# Patient Record
Sex: MALE | Race: WHITE | Employment: FULL TIME | URBAN - METROPOLITAN AREA
[De-identification: names, ages, dates, MRNs, and addresses within clinical notes are randomized per-mention and may not be internally consistent; named-entity substitution may affect disease eponyms.]

---

## 2019-12-06 ENCOUNTER — HOSPITAL ENCOUNTER (EMERGENCY)
Facility: CLINIC | Age: 24
Discharge: HOME | End: 2019-12-06
Attending: EMERGENCY MEDICINE | Admitting: EMERGENCY MEDICINE

## 2019-12-06 VITALS
DIASTOLIC BLOOD PRESSURE: 87 MMHG | RESPIRATION RATE: 18 BRPM | WEIGHT: 160 LBS | OXYGEN SATURATION: 99 % | HEIGHT: 72 IN | TEMPERATURE: 98.3 F | HEART RATE: 104 BPM | BODY MASS INDEX: 21.67 KG/M2 | SYSTOLIC BLOOD PRESSURE: 126 MMHG

## 2019-12-06 DIAGNOSIS — S01.81XA CHIN LACERATION, INITIAL ENCOUNTER: ICD-10-CM

## 2019-12-06 DIAGNOSIS — F10.220 ACUTE ALCOHOLIC INTOXICATION IN ALCOHOLISM WITHOUT COMPLICATION (H): ICD-10-CM

## 2019-12-06 PROCEDURE — 99283 EMERGENCY DEPT VISIT LOW MDM: CPT | Mod: 25 | Performed by: EMERGENCY MEDICINE

## 2019-12-06 PROCEDURE — 12011 RPR F/E/E/N/L/M 2.5 CM/<: CPT | Performed by: EMERGENCY MEDICINE

## 2019-12-06 PROCEDURE — 90715 TDAP VACCINE 7 YRS/> IM: CPT | Performed by: EMERGENCY MEDICINE

## 2019-12-06 PROCEDURE — 12011 RPR F/E/E/N/L/M 2.5 CM/<: CPT | Mod: Z6 | Performed by: EMERGENCY MEDICINE

## 2019-12-06 PROCEDURE — 99284 EMERGENCY DEPT VISIT MOD MDM: CPT | Mod: 25 | Performed by: EMERGENCY MEDICINE

## 2019-12-06 PROCEDURE — 90471 IMMUNIZATION ADMIN: CPT | Performed by: EMERGENCY MEDICINE

## 2019-12-06 PROCEDURE — 25000128 H RX IP 250 OP 636: Performed by: EMERGENCY MEDICINE

## 2019-12-06 RX ADMIN — CLOSTRIDIUM TETANI TOXOID ANTIGEN (FORMALDEHYDE INACTIVATED), CORYNEBACTERIUM DIPHTHERIAE TOXOID ANTIGEN (FORMALDEHYDE INACTIVATED), BORDETELLA PERTUSSIS TOXOID ANTIGEN (GLUTARALDEHYDE INACTIVATED), BORDETELLA PERTUSSIS FILAMENTOUS HEMAGGLUTININ ANTIGEN (FORMALDEHYDE INACTIVATED), BORDETELLA PERTUSSIS PERTACTIN ANTIGEN, AND BORDETELLA PERTUSSIS FIMBRIAE 2/3 ANTIGEN 0.5 ML: 5; 2; 2.5; 5; 3; 5 INJECTION, SUSPENSION INTRAMUSCULAR at 07:18

## 2019-12-06 ASSESSMENT — MIFFLIN-ST. JEOR: SCORE: 1758.76

## 2019-12-06 NOTE — ED TRIAGE NOTES
Pt fell tonight at hotel while drinking. Pt with slurred speech, red eyes. Pt hit chin. Laceration present, bleeding controlled.

## 2019-12-06 NOTE — ED AVS SNAPSHOT
[facility] Emergency Department  [address] [address]  Phone:  [phone]  Fax:  [phone]                                    José Manuel Rand   MRN: [id number]    Department:  [facility] Emergency Department   Date of Visit:  12/6/2019           After Visit Summary Signature Page    I have received my discharge instructions, and my questions have been answered. I have discussed any challenges I see with this plan with the nurse or doctor.    ..........................................................................................................................................  Patient/Patient Representative Signature      ..........................................................................................................................................  Patient Representative Print Name and Relationship to Patient    ..................................................               ................................................  Date                                   Time    ..........................................................................................................................................  Reviewed by Signature/Title    ...................................................              ..............................................  Date                                               Time          22EPIC Rev 08/18

## 2019-12-06 NOTE — ED NOTES
Pt presents with EMS for evaluation of chin lac. Bleeding controlled to area with light pressure. Per EMS, patient staying at hotel overnight--due to check into [place] for alcohol treatment this morning. Pt presents with slurred speech, red eyes, some agitation--does not want to be in ED, wants to go back to hotel for remainder of night. Pt reports he is staying at the hotel by himself--[place], [place] town yesterday.

## 2019-12-06 NOTE — DISCHARGE INSTRUCTIONS
Discharge Information: Emergency Department    José Manuel saw Dr. Araya for a cut on his chin.     Home care  Keep the wound clean and dry for 24 hours. After that, you can wash it gently with soap and water. Don t soak the wound and be gentle when drying it.  Do not put any cream or ointment on the wound. It was treated with Dermabond tissue glue. Using cream or ointment will make the glue fall off too soon. The glue should peel off in 5 to 10 days.  When the wound has healed, use sunscreen on it every time he will be in the sun for the next year or so. This will help the scar fade.     When to get help  Please return to the ED or contact his primary doctor if he   feels much worse.  has a fever over 102.  has pus or blood leaking from the wound, or the wound becomes very red or painful.  Call if you have any other concerns.

## 2019-12-06 NOTE — ED PROVIDER NOTES
History     Chief Complaint   Patient presents with     Laceration     pt fell at hotel, no LOC-per patient.       Alcohol Problem     pt intoxicated upon arrival to ED with EMS due to go to [place] this AM for alcohol abuse     HPI  José Manuel Rand is a 24 year old male  brought in by prehospital providers with altered mental status. He was reportedly found unable to care for self at a hotel after he fell and hit his chin. No reported LOC. No vomiting. There is a history of alcohol and or drug use as a suspicious etiology for altered mental status. The patient is unable to provide a coherent, reliable history. There was no reported history of seizure activity.      Allergies:  No Known Allergies    Problem List:    There are no active problems to display for this patient.       Past Medical History:    No past medical history on file.    Past Surgical History:    No past surgical history on file.    Family History:    No family history on file.    Social History:  Marital Status:  Single [1]  Social History     Tobacco Use     Smoking status: Not on file   Substance Use Topics     Alcohol use: Not on file     Drug use: Not on file        Medications:    No current outpatient medications on file.        Review of Systems  Unable to obtain upon presentation due to altered mental status    Physical Exam   BP: 126/87  Pulse: 104  Temp: 98.3  F (36.8  C)  Resp: 18  Height: 182.9 cm (6')  Weight: 72.6 kg (160 lb)  SpO2: 97 %      Physical Exam  Gen: calm and cooperative male, appears stated age  Heent: laceration to the chin. Nystagmus noted, eomi, perrla.   Neck: Supple, no meningismus.   Lungs: CTAB.   CVS: Regular, no murmurs.   ADB: Soft, NT, ND.   Back: Atraumatic.   Pelvis: Stable, normal.   Extremities: No trauma, pulses intact, cap refill normal.   Neuro: Slurred speech, cerebellar impairment, limited ability to follow commands    ED Course        [facility]    -Laceration  Repair  Date/Time: 12/6/2019 6:52 AM  Performed by: Arturo Araya MD  Authorized by: Arturo Araya MD       ANESTHESIA (see MAR for exact dosages):     Anesthesia method:  None  LACERATION DETAILS     Location:  Face    Face location:  Chin    Length (cm):  1.9    REPAIR TYPE:     Repair type:  Simple      EXPLORATION:     Hemostasis achieved with:  Direct pressure    Wound exploration: entire depth of wound probed and visualized      Contaminated: no      TREATMENT:     Area cleansed with:  Soap and water    Amount of cleaning:  Standard    SKIN REPAIR     Repair method:  Tissue adhesive    APPROXIMATION     Approximation:  Close  PROCEDURE   Patient Tolerance:  Patient tolerated the procedure well with no immediate complications                     Critical Care time:  none               No results found for this or any previous visit (from the past 24 hour(s)).    Medications - No data to display    Assessments & Plan (with Medical Decision Making)   ASSESS/PLAN:  AMS, likely secondary to etoh, cannot rule out more serious pathology initially but planned observation for need for further evaluation. Laceration repaired as above, tetanus updated. If AMS does not clear appropriately, as anticipated, further work up with possible head ct, drug screen or lab testing may be indicated.     ED COURSE:  After observation, patient cleared as expected. No signs of other traumatic injuries, no complaints. AMS likely secondary to etoh.  Patient admits etoh; denies cp, sob, abd pain, focal neuro complaints.  Remainder of ROS negative.  Will discharge, reccommended abstinence from alcohol and drugs.     I have reviewed the nursing notes.    I have reviewed the findings, diagnosis, plan and need for follow up with the patient.       New Prescriptions    No medications on file       Final diagnoses:   Acute alcoholic intoxication in alcoholism without complication (H)   Chin laceration, initial encounter        12/6/2019   [facility] EMERGENCY DEPARTMENT     Arturo Araya MD  12/06/19 0657